# Patient Record
Sex: MALE | Race: WHITE | NOT HISPANIC OR LATINO | URBAN - METROPOLITAN AREA
[De-identification: names, ages, dates, MRNs, and addresses within clinical notes are randomized per-mention and may not be internally consistent; named-entity substitution may affect disease eponyms.]

---

## 2018-02-02 LAB
ALT SERPL-CCNC: 16 U/L (ref 9–46)
AST SERPL-CCNC: 14 U/L (ref 10–40)
CHOLESTEROL (EXTERNAL): 146 MG/DL
CREATININE (EXTERNAL): 1.16 MG/DL (ref 0.6–1.35)
GFR ESTIMATED (EXTERNAL): 75 ML/MIN/1.73M2
GFR ESTIMATED (IF AFRICAN AMERICAN) (EXTERNAL): 86 ML/MIN/1.73M2
GLUCOSE (EXTERNAL): 94 MG/DL (ref 65–99)
HDLC SERPL-MCNC: 37 MG/DL
LDL CHOLESTEROL (EXTERNAL): 89 MG/DL
NON HDL CHOLESTEROL (EXTERNAL): 109 MG/DL
POTASSIUM (EXTERNAL): 4.1 MMOL/L (ref 3.5–5.3)
TRIGLYCERIDES (EXTERNAL): 108 MG/DL

## 2018-06-14 ENCOUNTER — TRANSFERRED RECORDS (OUTPATIENT)
Dept: HEALTH INFORMATION MANAGEMENT | Facility: CLINIC | Age: 48
End: 2018-06-14

## 2018-06-14 LAB
HEP C HIM: NORMAL
HIV 1&2 EXT: NORMAL

## 2020-06-22 ENCOUNTER — TRANSFERRED RECORDS (OUTPATIENT)
Dept: INFECTIOUS DISEASES | Facility: CLINIC | Age: 50
End: 2020-06-22

## 2020-06-23 LAB
ALT SERPL-CCNC: 21 U/L (ref 9–46)
AST SERPL-CCNC: 22 U/L (ref 10–35)
CHOLESTEROL (EXTERNAL): 154 MG/DL
CREATININE (EXTERNAL): 1.08 MG/DL (ref 0.7–1.33)
GFR ESTIMATED (EXTERNAL): 80 ML/MIN/1.73M2
GFR ESTIMATED (IF AFRICAN AMERICAN) (EXTERNAL): 92 ML/MIN/1.73M2
GLUCOSE (EXTERNAL): 87 MG/DL (ref 65–99)
HDLC SERPL-MCNC: 58 MG/DL
LDL CHOLESTEROL (EXTERNAL): 79 MG/DL
NON HDL CHOLESTEROL (EXTERNAL): 96 MG/DL
POTASSIUM (EXTERNAL): 4.1 MMOL/L (ref 3.5–5.3)
TRIGLYCERIDES (EXTERNAL): 88 MG/DL

## 2021-02-18 LAB
ALT SERPL-CCNC: 16 U/L (ref 9–46)
AST SERPL-CCNC: 19 U/L (ref 10–35)
CREATININE (EXTERNAL): 1 MG/DL (ref 0.7–1.33)
GFR ESTIMATED (EXTERNAL): 87 ML/MIN/1.73M2
GFR ESTIMATED (IF AFRICAN AMERICAN) (EXTERNAL): 101 ML/MIN/1.73M2
GLUCOSE (EXTERNAL): 95 MG/DL (ref 65–99)
HBA1C MFR BLD: 5 %
POTASSIUM (EXTERNAL): 4.6 MMOL/L (ref 3.5–5.3)
TSH SERPL-ACNC: 1.46 MIU/L (ref 0.4–4.5)

## 2021-11-08 ENCOUNTER — TRANSFERRED RECORDS (OUTPATIENT)
Dept: INFECTIOUS DISEASES | Facility: CLINIC | Age: 51
End: 2021-11-08

## 2021-11-08 LAB
ALT SERPL-CCNC: 24 U/L (ref 9–46)
AST SERPL-CCNC: 25 U/L (ref 10–35)
CHOLESTEROL (EXTERNAL): 176 MG/DL
CREATININE (EXTERNAL): 1.16 MG/DL (ref 0.7–1.33)
GLUCOSE (EXTERNAL): 84 MG/DL (ref 65–99)
HDLC SERPL-MCNC: 50 MG/DL
HEP C HIM: NORMAL
HIV 1&2 EXT: NORMAL
LDL CHOLESTEROL (EXTERNAL): 96 MG/DL
NON HDL CHOLESTEROL (EXTERNAL): 126 MG/DL
POTASSIUM (EXTERNAL): 4.2 MMOL/L (ref 3.5–5.3)
TRIGLYCERIDES (EXTERNAL): 209 MG/DL

## 2022-06-01 ENCOUNTER — TELEPHONE (OUTPATIENT)
Dept: BEHAVIORAL HEALTH | Facility: CLINIC | Age: 52
End: 2022-06-01

## 2022-06-01 NOTE — TELEPHONE ENCOUNTER
6/1/22: Pt's RN Care Coordinator (volunteer capacity, not a formal role), Michelle, stated that Dominique Plaza told her to call our scheduling line and figure out how to get pt scheduled. He was reportedly seeing an [facility] provider in [place], but needs to get set up with one here. OP intake explained that due to procedure, she's unable to schedule at this time. OP intake offered to schedule with CC, attempted to provide info about the Recovery Clinic. RN CC stated that she will [place] back with provider and/or call Central Scheduling to help pt establish care.

## 2022-06-03 NOTE — CONFIDENTIAL NOTE
OK for patient to schedule with Dominique Plaza.    Please call patient back at number listed in this encounter, or you may be able to reach him at [facility] # is [phone]    Connor Gu MD  Addiction Medicine

## 2022-06-23 ENCOUNTER — VIRTUAL VISIT (OUTPATIENT)
Dept: BEHAVIORAL HEALTH | Facility: CLINIC | Age: 52
End: 2022-06-23
Attending: PSYCHIATRY & NEUROLOGY
Payer: COMMERCIAL

## 2022-06-23 ENCOUNTER — TELEPHONE (OUTPATIENT)
Dept: BEHAVIORAL HEALTH | Facility: CLINIC | Age: 52
End: 2022-06-23

## 2022-06-23 DIAGNOSIS — F15.20 METHAMPHETAMINE USE DISORDER, SEVERE (H): Primary | ICD-10-CM

## 2022-06-23 DIAGNOSIS — Z86.59 HISTORY OF POST TRAUMATIC STRESS DISORDER: ICD-10-CM

## 2022-06-23 DIAGNOSIS — F32.A DEPRESSION, UNSPECIFIED DEPRESSION TYPE: ICD-10-CM

## 2022-06-23 DIAGNOSIS — B20 HUMAN IMMUNODEFICIENCY VIRUS (HIV) DISEASE (H): ICD-10-CM

## 2022-06-23 DIAGNOSIS — Z86.59: ICD-10-CM

## 2022-06-23 PROCEDURE — 99204 OFFICE O/P NEW MOD 45 MIN: CPT | Mod: GT | Performed by: NURSE PRACTITIONER

## 2022-06-23 RX ORDER — ESZOPICLONE 2 MG/1
2 TABLET, FILM COATED ORAL AT BEDTIME
COMMUNITY

## 2022-06-23 RX ORDER — ETRAVIRINE 200 MG/1
TABLET ORAL
COMMUNITY
End: 2022-06-23

## 2022-06-23 RX ORDER — BUPROPION HYDROCHLORIDE 150 MG/1
150 TABLET ORAL AT BEDTIME
COMMUNITY

## 2022-06-23 RX ORDER — MIRTAZAPINE 15 MG/1
15 TABLET, FILM COATED ORAL AT BEDTIME
COMMUNITY

## 2022-06-23 RX ORDER — TOPIRAMATE 25 MG/1
TABLET, FILM COATED ORAL
COMMUNITY
End: 2022-06-23

## 2022-06-23 RX ORDER — BICTEGRAVIR SODIUM, EMTRICITABINE, AND TENOFOVIR ALAFENAMIDE FUMARATE 50; 200; 25 MG/1; MG/1; MG/1
1 TABLET ORAL DAILY
COMMUNITY

## 2022-06-23 RX ORDER — PRAZOSIN HYDROCHLORIDE 2 MG/1
CAPSULE ORAL
COMMUNITY
End: 2022-06-23

## 2022-06-23 RX ORDER — NALTREXONE HYDROCHLORIDE 50 MG/1
50 TABLET, FILM COATED ORAL DAILY
Qty: 90 TABLET | Refills: 1 | Status: SHIPPED | OUTPATIENT
Start: 2022-06-23

## 2022-06-23 RX ORDER — BUPROPION HYDROCHLORIDE 300 MG/1
300 TABLET ORAL EVERY MORNING
COMMUNITY
End: 2022-07-19

## 2022-06-23 RX ORDER — EMTRICITABINE AND TENOFOVIR DISOPROXIL FUMARATE 200; 300 MG/1; MG/1
TABLET, FILM COATED ORAL
COMMUNITY
End: 2022-06-23

## 2022-06-23 RX ORDER — TOPIRAMATE 200 MG/1
TABLET, FILM COATED ORAL
COMMUNITY
End: 2022-06-23

## 2022-06-23 RX ORDER — DESVENLAFAXINE 100 MG/1
TABLET, EXTENDED RELEASE ORAL
COMMUNITY
End: 2022-06-23

## 2022-06-23 RX ORDER — PRAZOSIN HYDROCHLORIDE 5 MG/1
CAPSULE ORAL
COMMUNITY
End: 2022-06-23

## 2022-06-23 RX ORDER — NALTREXONE HYDROCHLORIDE 50 MG/1
50 TABLET, FILM COATED ORAL DAILY
COMMUNITY
End: 2022-06-23

## 2022-06-23 RX ORDER — VARENICLINE TARTRATE 1 MG/1
TABLET, FILM COATED ORAL
COMMUNITY
End: 2022-06-23

## 2022-06-23 RX ORDER — QUETIAPINE FUMARATE 25 MG/1
TABLET, FILM COATED ORAL
COMMUNITY
End: 2022-06-23

## 2022-06-23 RX ORDER — TRAZODONE HYDROCHLORIDE 150 MG/1
100 TABLET ORAL AT BEDTIME
COMMUNITY
End: 2022-08-12

## 2022-06-23 RX ORDER — TOPIRAMATE 100 MG/1
TABLET, FILM COATED ORAL
COMMUNITY
End: 2022-06-23

## 2022-06-23 ASSESSMENT — ANXIETY QUESTIONNAIRES
4. TROUBLE RELAXING: NEARLY EVERY DAY
2. NOT BEING ABLE TO STOP OR CONTROL WORRYING: NOT AT ALL
1. FEELING NERVOUS, ANXIOUS, OR ON EDGE: MORE THAN HALF THE DAYS
3. WORRYING TOO MUCH ABOUT DIFFERENT THINGS: NOT AT ALL
5. BEING SO RESTLESS THAT IT IS HARD TO SIT STILL: NOT AT ALL
GAD7 TOTAL SCORE: 6
6. BECOMING EASILY ANNOYED OR IRRITABLE: SEVERAL DAYS
GAD7 TOTAL SCORE: 6
7. FEELING AFRAID AS IF SOMETHING AWFUL MIGHT HAPPEN: NOT AT ALL

## 2022-06-23 ASSESSMENT — PATIENT HEALTH QUESTIONNAIRE - PHQ9: SUM OF ALL RESPONSES TO PHQ QUESTIONS 1-9: 8

## 2022-06-23 NOTE — PROGRESS NOTES
Medications Phoned  to Pharmacy [] yes [x]no     Medications ordered this visit were e-scribed.  Verified by order class [x] yes  [] no  List medications sent to pharmacy: Naltrexone 50mg    Medication changes or discontinuations were communicated to patient's pharmacy: [] yes  [x] no     Dictation completed at time of chart check: [x] yes  [] no     I have checked the documentation for today s encounters and the above information has been reviewed and completed.        Paula Reynoso CMA June 23, 2022 2:10 PM       This video/telephone visit will be conducted via a call between you and your physician/provider. We have found that certain health care needs can be provided without the need for an in-person physical exam. This service lets us provide the care you need with a video /telephone conversation. If a prescription is necessary we can send it directly to your pharmacy. If lab work is needed we can place an order for that and you can then stop by our lab to have the test done at a later time.    Just as we bill insurance for in-person visits, we also bill insurance for video/telephone visits. If you have questions about your insurance coverage, we recommend that you speak with your insurance company.    Patient has given verbal consent for video/Telephone visit? Yes    Patient would like video visit, please connect : Text link to [phone]  Reason for visit: Intake- Currently in inpatient treatment for addiction-previous methamphetamine use.   Patient verified allergies, medications and pharmacy via telephone.     MALIKA-7 scores:    MALIKA-7 SCORE 6/23/2022   Total Score 6       PHQ-9 scores:   PHQ-9 SCORE 6/23/2022   PHQ-9 Total Score 8       Patient states he  is ready for visit.    Paula Reynoso CMA June 23, 2022 11:46 AM

## 2022-06-23 NOTE — TELEPHONE ENCOUNTER
Coordinator forwarded medication management referral to TC RN pool since next level of care is scheduled. Reply sent to referral source.     Rosaurajudy Gaytan  Transition Clinic Coordinator  Date and Time: 06/23/22 1:40 PM    ----- Message from Vlad Ann sent at 6/23/2022  1:17 PM CDT -----  Regarding: Transition Clinic  Transition Clinic Referral   [place] Only   Limited [place] Availability    Type of Referral:    Referral Type: Therapy: Psychiatry next level of care appointment is not scheduled. Referral for Transition Clinic Psychiatry will be automatically declined if no next level of care scheduled.     TC Psychiatry cannot see patients who do not have active medical insurance    Referring Provider Name: Dominique Plaza CNP    Clinician completing the assessment.     Referring Provider: [facility] PROVIDER    If known, referring provider contact name: Dominique Plaza CNP; Phone Number:   Service Line/Location: [facility] ADDICTION    Reason for Transition Clinic Referral: Psychiatry appointment not available until 12/22/22    Next Level of Care Patient Will Be Transitioned To: [facility] Psychiatry  Provider(s) [facility] Psychiatry cannot see patients who do not have active medical insurance    What Would Be Helpful from the Transition Clinic: Medication management. Interim care.     Needs: NO    Does Patient Have Access to Technology: Yes    Patient E-mail Address: [email or web address]    Current Patient Phone Number: [phone];   Clinician Gender Preference (if applicable): NO    Vlad Ann
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
## 2022-06-24 ENCOUNTER — MYC REFILL (OUTPATIENT)
Dept: BEHAVIORAL HEALTH | Facility: CLINIC | Age: 52
End: 2022-06-24

## 2022-06-24 NOTE — TELEPHONE ENCOUNTER
First attempt to contact pt. Abrahanr left a VM with TC contact info and encouraged a phone call back to schedule initial psychiatry appointment. Writer will postpone for tomorrow.    Tova Alexanderrera  06/24/22  941     Mental Health &Addiction (MH&A)Transition Clinic (TC):     Provides Patient Support While Waiting to Access Programmatic and Outpatient MH&A Care and Provides Select Crisis Assessment Services     NURSING Referral Review  _________________________________________    This RN has reviewed this Medication Management referral to the Transition Clinic and deemed the referral   [x] Appropriate  [] Inappropriate   []Consulting     Based on the following criteria:    Pt has a psychiatric provider (or pending plan) in place for future prescribing: Hilda Gallagher 12/22/22     Timeframe until pt's scheduled psychiatry appointment is less than 6 months: yes    Pt takes psychiatric medications:   traZODone (DESYREL) 150 MG tablet  buPROPion (WELLBUTRIN XL) 150 MG 24 hr tablet  mirtazapine (REMERON) 15 MG tablet  buPROPion (WELLBUTRIN XL) 300 MG 24 hr tablet      Pt's goals seem to align with this temporary service: yes    Any additional pertinent information regarding this referral: Seeing Dominique Plaza for addiction medicine. Please refer to notes from these appointments for further information     Initial contact w/ patient/parent: Left message to call back for more information regarding the transition clinic and to schedule.     The Transition Clinic phone # is [phone].    Rosalind Jaffe RN on June 24, 2022 at 9:29 AM    Additional Scheduling Instructions for Transition Clinic Coordinator:      Patient did not answer the phone. He is an appropriate candidate to be scheduled with [facility].

## 2022-06-24 NOTE — TELEPHONE ENCOUNTER
Transition Clinic Referral   [place] Only   Limited [place] Availability     Type of Referral:     Referral Type: Therapy: Psychiatry next level of care appointment is not scheduled. Referral for Transition Clinic Psychiatry will be automatically declined if no next level of care scheduled.     TC Psychiatry cannot see patients who do not have active medical insurance     Referring Provider Name: Dominique Plaza CNP     Clinician completing the assessment.     Referring Provider: [facility] PROVIDER     If known, referring provider contact name: Dominique Plaza CNP; Phone Number:   Service Line/Location: [facility] ADDICTION     Reason for Transition Clinic Referral: Psychiatry appointment not available until 12/22/22     Next Level of Care Patient Will Be Transitioned To: E Psychiatry   Provider(s) Hilda Gallagher   Location [facility]   TC Psychiatry cannot see patients who do not have active medical insurance     What Would Be Helpful from the Transition Clinic: Medication management. Interim care.      Needs: NO     Does Patient Have Access to Technology: Yes     Patient E-mail Address: [email or web address]     Current Patient Phone Number: [phone];   Clinician Gender Preference (if applicable): NO     Vlad Ann

## 2022-06-28 ENCOUNTER — VIRTUAL VISIT (OUTPATIENT)
Dept: BEHAVIORAL HEALTH | Facility: CLINIC | Age: 52
End: 2022-06-28
Attending: PSYCHIATRY & NEUROLOGY
Payer: COMMERCIAL

## 2022-06-28 DIAGNOSIS — F43.10 PTSD (POST-TRAUMATIC STRESS DISORDER): ICD-10-CM

## 2022-06-28 DIAGNOSIS — F33.1 MODERATE EPISODE OF RECURRENT MAJOR DEPRESSIVE DISORDER (H): Primary | ICD-10-CM

## 2022-06-28 DIAGNOSIS — F15.20 METHAMPHETAMINE USE DISORDER, SEVERE (H): ICD-10-CM

## 2022-06-28 PROCEDURE — 90834 PSYTX W PT 45 MINUTES: CPT | Mod: GT
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
## 2022-07-01 PROBLEM — Z86.59 HISTORY OF POST TRAUMATIC STRESS DISORDER: Status: ACTIVE | Noted: 2022-07-01
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
## 2022-07-06 ENCOUNTER — VIRTUAL VISIT (OUTPATIENT)
Dept: BEHAVIORAL HEALTH | Facility: CLINIC | Age: 52
End: 2022-07-06
Payer: COMMERCIAL

## 2022-07-06 DIAGNOSIS — F43.10 PTSD (POST-TRAUMATIC STRESS DISORDER): Primary | ICD-10-CM

## 2022-07-06 PROCEDURE — 90791 PSYCH DIAGNOSTIC EVALUATION: CPT

## 2022-07-06 ASSESSMENT — ANXIETY QUESTIONNAIRES
GAD7 TOTAL SCORE: 5
8. IF YOU CHECKED OFF ANY PROBLEMS, HOW DIFFICULT HAVE THESE MADE IT FOR YOU TO DO YOUR WORK, TAKE CARE OF THINGS AT HOME, OR GET ALONG WITH OTHER PEOPLE?: SOMEWHAT DIFFICULT
6. BECOMING EASILY ANNOYED OR IRRITABLE: SEVERAL DAYS
GAD7 TOTAL SCORE: 5
7. FEELING AFRAID AS IF SOMETHING AWFUL MIGHT HAPPEN: NOT AT ALL
5. BEING SO RESTLESS THAT IT IS HARD TO SIT STILL: NOT AT ALL
4. TROUBLE RELAXING: SEVERAL DAYS
3. WORRYING TOO MUCH ABOUT DIFFERENT THINGS: SEVERAL DAYS
7. FEELING AFRAID AS IF SOMETHING AWFUL MIGHT HAPPEN: NOT AT ALL
GAD7 TOTAL SCORE: 5
2. NOT BEING ABLE TO STOP OR CONTROL WORRYING: SEVERAL DAYS
1. FEELING NERVOUS, ANXIOUS, OR ON EDGE: SEVERAL DAYS

## 2022-07-08 ASSESSMENT — COLUMBIA-SUICIDE SEVERITY RATING SCALE - C-SSRS
1. HAVE YOU WISHED YOU WERE DEAD OR WISHED YOU COULD GO TO SLEEP AND NOT WAKE UP?: YES
1. IN THE PAST MONTH, HAVE YOU WISHED YOU WERE DEAD OR WISHED YOU COULD GO TO SLEEP AND NOT WAKE UP?: NO
2. HAVE YOU ACTUALLY HAD ANY THOUGHTS OF KILLING YOURSELF?: NO

## 2022-07-18 ENCOUNTER — TELEPHONE (OUTPATIENT)
Dept: INFECTIOUS DISEASES | Facility: CLINIC | Age: 52
End: 2022-07-18

## 2022-07-18 DIAGNOSIS — Z21 HUMAN IMMUNODEFICIENCY VIRUS I INFECTION (H): Primary | ICD-10-CM

## 2022-07-18 NOTE — TELEPHONE ENCOUNTER
Per [facility] Protocol, routine B20 lab orders entered as pt is due for them in order to monitor pt's disease state.  Kaylee Stanley RN

## 2022-07-19 ENCOUNTER — VIRTUAL VISIT (OUTPATIENT)
Dept: ADDICTION MEDICINE | Facility: CLINIC | Age: 52
End: 2022-07-19
Attending: PSYCHIATRY & NEUROLOGY
Payer: COMMERCIAL

## 2022-07-19 DIAGNOSIS — F41.9 ANXIETY: ICD-10-CM

## 2022-07-19 DIAGNOSIS — F15.20 METHAMPHETAMINE USE DISORDER, SEVERE, IN CONTROLLED ENVIRONMENT (H): Primary | ICD-10-CM

## 2022-07-19 PROCEDURE — 99214 OFFICE O/P EST MOD 30 MIN: CPT | Mod: TEL | Performed by: NURSE PRACTITIONER

## 2022-07-19 RX ORDER — QUETIAPINE FUMARATE 25 MG/1
25 TABLET, FILM COATED ORAL 2 TIMES DAILY PRN
Qty: 60 TABLET | Refills: 0 | Status: SHIPPED | OUTPATIENT
Start: 2022-07-19 | End: 2022-08-15

## 2022-07-19 RX ORDER — BUPROPION HYDROCHLORIDE 300 MG/1
300 TABLET ORAL EVERY MORNING
Qty: 90 TABLET | Refills: 1 | Status: SHIPPED | OUTPATIENT
Start: 2022-07-19

## 2022-07-19 NOTE — PROGRESS NOTES
Not roomed by support staff       MH[facility] Post-Appointment Chart -check      Correct pharmacy verified with patient and updated in chart? [] yes [x]no    Medications ordered this visit were e-scribed.  Verified by order class [x] yes  [] no    List Medications:    QUEtiapine (SEROQUEL) 25 MG tablet  buPROPion (WELLBUTRIN XL) 300 MG 24 hr tablet  Class: E-Prescribe    Medication changes or discontinuations were communicated to patient's pharmacy: [] yes  [x] no    UA collected [] yes  [] no  [x] n/a-virtual     Outside referrals / labs, etc support staff to follow up: [] yes  [x] no    Future appointment was made: [] yes  [x] no  [] n/a    Dictation completed at time of chart check: [] yes  [x] no    I have checked the documentation for today s encounters and the above information has been reviewed and completed.      Sandie Chambers on July 19, 2022 at 3:19 PM
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
## 2022-07-24 ENCOUNTER — HEALTH MAINTENANCE LETTER (OUTPATIENT)
Age: 52
End: 2022-07-24

## 2022-07-26 NOTE — TELEPHONE ENCOUNTER
RECORDS RECEIVED FROM: Internal   DATE RECEIVED: 08.17.2022   NOTES (Gather within 2 years) STATUS DETAILS   OFFICE NOTE from referring provider   Internal 06.23.2022 Dominique Plaza CNP   OFFICE NOTE from other specialist     DISCHARGE SUMMARY from hospital     DISCHARGE REPORT from the ER     LABS (any labs) Internal    MEDICATION LIST Internal    IMAGING  (NEED IMAGES AND REPORTS)     Osteomyelitis: Foot imaging      Liver Abscess: Abdominal imaging     Other (anything related to diagnoses

## 2022-07-27 ENCOUNTER — VIRTUAL VISIT (OUTPATIENT)
Dept: BEHAVIORAL HEALTH | Facility: CLINIC | Age: 52
End: 2022-07-27
Payer: COMMERCIAL

## 2022-07-27 DIAGNOSIS — F33.1 MODERATE EPISODE OF RECURRENT MAJOR DEPRESSIVE DISORDER (H): Primary | ICD-10-CM

## 2022-07-27 DIAGNOSIS — F43.10 PTSD (POST-TRAUMATIC STRESS DISORDER): ICD-10-CM

## 2022-07-27 PROCEDURE — 90832 PSYTX W PT 30 MINUTES: CPT | Mod: GT

## 2022-07-27 NOTE — PROGRESS NOTES
[facility]  2022      Behavioral Health Clinician Progress Note    Patient Name: Sidney Tilley           Service Type:  Individual      Service Location:   MyChart / Email (patient reached)     Session Start Time: 1:08pm  Session End Time: 1:40pm      Session Length: 16 - 37      Attendees: Client     Service Modality:  Video Visit:      Provider verified identity through the following two step process.  Patient provided:  Patient     Telemedicine Visit: The patient's condition can be safely assessed and treated via synchronous audio and visual telemedicine encounter.      Reason for Telemedicine Visit: Patient has requested telehealth visit    Originating Site (Patient Location):  [facility]    Distant Site (Provider Location): [facility]    Consent:  The patient/guardian has verbally consented to: the potential risks and benefits of telemedicine (video visit) versus in person care; bill my insurance or make self-payment for services provided; and responsibility for payment of non-covered services.     Patient would like the video invitation sent by:  Text to cell phone: [phone]     Mode of Communication:  Video Conference via [facility]well    As the provider I attest to compliance with applicable laws and regulations related to telemedicine.    Visit Activities (Refresh list every visit): [facility] Only    Diagnostic Assessment Date: 2022  Treatment Plan Review Date: In progress  See Flowsheets for today's PHQ-9 and MALIKA-7 results  Previous PHQ-9:   PHQ-9 SCORE 2022   PHQ-9 Total Score 8     Previous MALIKA-7:   MALIKA-7 SCORE 2022   Total Score - 5 (mild anxiety)   Total Score 6 5       ALINE LEVEL:  No flowsheet data found.    DATA  Extended Session (60+ minutes): No  Interactive Complexity: No  Crisis: No  [facility] Patient: No    Treatment Objective(s)  Addressed in This Session:  Target Behavior(s): disease management/lifestyle changes patient would like to learn coping skills to better manage his symptoms of anxiety/depression and interpersonal functioning skills     Relationship Problems: will address relationship difficulties in a more adaptive manner  Functional Impairment: will effectively address problems that interfere with adaptive functioning  Alcohol / Substance Use: will increase understanding of the effects of substance use  will make healthier choices in regard to substance use  continue to make healthy choices regarding substance use and engage in activities / supportive services that promote sobriety    Current Stressors / Issues:  [facility] met with Sidney via virtual visit. He reports that he has not been experiencing as many urges and cravings to use meth, however when he has them they come on strong and unpredictably. [facility] and Sidney discussed ways in which he domi with these urges and he reports that he has been able to stay sober for over 70 days by reminding himself that the urge will pass and by using distraction techniques such as walking outside, going on the elliptical, and smoking cigarettes. He shared that he knows smoking is not the most healthy strategy and since Monday he is working on quitting using lozenges and nicotine patches. He also reports that Dominique Plaza, his addiction medicine provider, prescribed him Seroquel to take as needed in case the cravings become unmanageable. He shared that he hasn't had to use this yet. [facility] acknowledged his extended period of sobriety and validated how much effort and work he has been putting into this. He then processed with [facility] about a predicament with his aftercare plan and how to make a thoughtful decision about his next steps. Sidney also shared with [facility] that he found out some information about his close friend and some financial debt that he found himself in. [facility] guided Sidney through  processing his emotions of anger and grief around this situation and [facility] encouraged Sidney to try not to push these feelings away but instead allow himself to feel them and work through them. Sidney told [facility] that he has attempted to try journaling about these feelings and allowing himself to process through them this way. Sidney reports that he is still having night terrors regularly but he is able to return to sleep once he is woken up by one. He also reports he is losing weight through working out and diet and he feels encouraged by this. He told [facility] that his long-term therapist in [place] returned from medical leave and he's working on getting credentialed to work with Sidney while he's in [place] and he said feels that the continuity of care would be helpful for him. [facility] and Sidney are scheduled to meet in three weeks to check in.    Progress on Treatment Objective(s) / Homework:  Satisfactory progress - ACTION (Actively working towards change); Intervened by reinforcing change plan / affirming steps taken    Motivational Interviewing    MI Intervention: Expressed Empathy/Understanding, Supported Autonomy, Collaboration, Evocation, Open-ended questions and Reflections: simple and complex     Change Talk Expressed by the Patient: Desire to change Reasons to change Need to change Committment to change    Provider Response to Change Talk: E - Evoked more info from patient about behavior change, A - Affirmed patient's thoughts, decisions, or attempts at behavior change, R - Reflected patient's change talk and S - Summarized patient's change talk statements      Care Plan review completed: No    Medication Review:  No changes to current psychiatric medication(s)    Medication Compliance:  Yes    Changes in Health Issues:   None reported    Chemical Use Review:   Substance Use: Chemical use reviewed, no active concerns identified , patient is currently in a 90 day treatment center     Tobacco Use: Yes, decrease.   Patient reports frequency of use none as of two days ago. Provided support and affirmation for steps taken towards quiting     Assessment: Current Emotional / Mental Status (status of significant symptoms):  Risk status (Self / Other harm or suicidal ideation)  Patient denies a history of suicidal ideation, suicide attempts, self-injurious behavior, homicidal ideation, homicidal behavior and and other safety concerns  Patient denies current fears or concerns for personal safety.  Patient denies current or recent suicidal ideation or behaviors.  Patient denies current or recent homicidal ideation or behaviors.  Patient denies current or recent self injurious behavior or ideation.  Patient denies other safety concerns.  A safety and risk management plan has not been developed at this time, however patient was encouraged to call [place] [phone] should there be a change in any of these risk factors.    Appearance:   Appropriate   Eye Contact:   Good   Psychomotor Behavior: Normal  Restless   Attitude:   Cooperative  Friendly  Orientation:   All  Speech   Rate / Production: Normal/ Responsive   Volume:  Normal   Mood:    Anxious  Normal  Affect:    Appropriate  Blunted   Thought Content:  Clear   Thought Form:  Coherent  Logical   Insight:    Fair     Diagnoses:  1. Moderate episode of recurrent major depressive disorder (H)    2. PTSD (post-traumatic stress disorder)        Collateral Reports Completed:  Not Applicable    Plan: (Homework, other):  Patient was given information about behavioral services and encouraged to schedule a follow up appointment with the clinic [facility] in 3 weeks.  He was also given Cognitive Behavioral Therapy skills to practice when experiencing anxiety and depression and deep Breathing Strategies to practice when experiencing anxiety and depression.  CD Recommendations: Maintain Sobriety. CARMINA De La Fuente, [facility]      Provider Name/ Credentials:  CARMINA Juan  July 27th,  2022    Service performed and documented by LASHAE-Yuly Wood  Note reviewed and clinical supervison by SHMUEL Mejia, [facility] on 8/2/22

## 2022-08-10 ENCOUNTER — LAB (OUTPATIENT)
Dept: LAB | Facility: CLINIC | Age: 52
End: 2022-08-10
Payer: COMMERCIAL

## 2022-08-10 DIAGNOSIS — Z21 HUMAN IMMUNODEFICIENCY VIRUS I INFECTION (H): ICD-10-CM

## 2022-08-10 LAB
ALBUMIN SERPL-MCNC: 4 G/DL (ref 3.4–5)
ALP SERPL-CCNC: 81 U/L (ref 40–150)
ALT SERPL W P-5'-P-CCNC: 33 U/L (ref 0–70)
ANION GAP SERPL CALCULATED.3IONS-SCNC: <1 MMOL/L (ref 3–14)
AST SERPL W P-5'-P-CCNC: 29 U/L (ref 0–45)
BASOPHILS # BLD AUTO: 0 10E3/UL (ref 0–0.2)
BASOPHILS NFR BLD AUTO: 0 %
BILIRUB SERPL-MCNC: 0.6 MG/DL (ref 0.2–1.3)
BUN SERPL-MCNC: 15 MG/DL (ref 7–30)
CALCIUM SERPL-MCNC: 9.2 MG/DL (ref 8.5–10.1)
CD3 CELLS # BLD: 2251 CELLS/UL (ref 603–2990)
CD3 CELLS NFR BLD: 85 % (ref 49–84)
CD3+CD4+ CELLS # BLD: 1286 CELLS/UL (ref 441–2156)
CD3+CD4+ CELLS NFR BLD: 49 % (ref 28–63)
CD3+CD4+ CELLS/CD3+CD8+ CLL BLD: 1.72 % (ref 1.4–2.6)
CD3+CD8+ CELLS # BLD: 746 CELLS/UL (ref 125–1312)
CD3+CD8+ CELLS NFR BLD: 28 % (ref 10–40)
CHLORIDE BLD-SCNC: 109 MMOL/L (ref 94–109)
CO2 SERPL-SCNC: 26 MMOL/L (ref 20–32)
CREAT SERPL-MCNC: 1.07 MG/DL (ref 0.66–1.25)
EOSINOPHIL # BLD AUTO: 0.1 10E3/UL (ref 0–0.7)
EOSINOPHIL NFR BLD AUTO: 2 %
ERYTHROCYTE [DISTWIDTH] IN BLOOD BY AUTOMATED COUNT: 11.7 % (ref 10–15)
GFR SERPL CREATININE-BSD FRML MDRD: 83 ML/MIN/1.73M2
GLUCOSE BLD-MCNC: 92 MG/DL (ref 70–99)
HCT VFR BLD AUTO: 45.1 % (ref 40–53)
HGB BLD-MCNC: 15.8 G/DL (ref 13.3–17.7)
IMM GRANULOCYTES # BLD: 0 10E3/UL
IMM GRANULOCYTES NFR BLD: 0 %
LYMPHOCYTES # BLD AUTO: 2.7 10E3/UL (ref 0.8–5.3)
LYMPHOCYTES NFR BLD AUTO: 38 %
MCH RBC QN AUTO: 32.1 PG (ref 26.5–33)
MCHC RBC AUTO-ENTMCNC: 35 G/DL (ref 31.5–36.5)
MCV RBC AUTO: 92 FL (ref 78–100)
MONOCYTES # BLD AUTO: 0.5 10E3/UL (ref 0–1.3)
MONOCYTES NFR BLD AUTO: 8 %
NEUTROPHILS # BLD AUTO: 3.7 10E3/UL (ref 1.6–8.3)
NEUTROPHILS NFR BLD AUTO: 52 %
NRBC # BLD AUTO: 0 10E3/UL
NRBC BLD AUTO-RTO: 0 /100
PLATELET # BLD AUTO: 174 10E3/UL (ref 150–450)
POTASSIUM BLD-SCNC: 3.9 MMOL/L (ref 3.4–5.3)
PROT SERPL-MCNC: 7.2 G/DL (ref 6.8–8.8)
RBC # BLD AUTO: 4.92 10E6/UL (ref 4.4–5.9)
SODIUM SERPL-SCNC: 134 MMOL/L (ref 133–144)
T CELL COMMENT: ABNORMAL
WBC # BLD AUTO: 7.1 10E3/UL (ref 4–11)

## 2022-08-10 PROCEDURE — 87536 HIV-1 QUANT&REVRSE TRNSCRPJ: CPT | Performed by: STUDENT IN AN ORGANIZED HEALTH CARE EDUCATION/TRAINING PROGRAM

## 2022-08-10 PROCEDURE — 36415 COLL VENOUS BLD VENIPUNCTURE: CPT | Performed by: PATHOLOGY

## 2022-08-10 PROCEDURE — 86360 T CELL ABSOLUTE COUNT/RATIO: CPT | Performed by: STUDENT IN AN ORGANIZED HEALTH CARE EDUCATION/TRAINING PROGRAM

## 2022-08-10 PROCEDURE — 85025 COMPLETE CBC W/AUTO DIFF WBC: CPT | Performed by: PATHOLOGY

## 2022-08-10 PROCEDURE — 80053 COMPREHEN METABOLIC PANEL: CPT | Performed by: PATHOLOGY

## 2022-08-12 ENCOUNTER — VIRTUAL VISIT (OUTPATIENT)
Dept: ADDICTION MEDICINE | Facility: CLINIC | Age: 52
End: 2022-08-12
Payer: COMMERCIAL

## 2022-08-12 DIAGNOSIS — G47.00 INSOMNIA, UNSPECIFIED TYPE: ICD-10-CM

## 2022-08-12 DIAGNOSIS — F15.21: Primary | ICD-10-CM

## 2022-08-12 LAB
HIV1 RNA # PLAS NAA DL=20: <20 COPIES/ML
HIV1 RNA SERPL NAA+PROBE-LOG#: <1.3 {LOG_COPIES}/ML

## 2022-08-12 PROCEDURE — 99214 OFFICE O/P EST MOD 30 MIN: CPT | Mod: TEL | Performed by: NURSE PRACTITIONER

## 2022-08-12 RX ORDER — TRAZODONE HYDROCHLORIDE 150 MG/1
150 TABLET ORAL AT BEDTIME
Qty: 30 TABLET | Refills: 1 | Status: SHIPPED | OUTPATIENT
Start: 2022-08-12

## 2022-08-12 NOTE — PROGRESS NOTES
[facility]    A/P                                                    ASSESSMENT/PLAN  Diagnoses and all orders for this visit:  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  Denies side effects.  Reports some cravings relieved with as needed Seroquel.  He remains in residential treatment program until 9/1 then will move into sober living.  Plan to continue naltrexone and Wellbutrin.  No refills required at this time  Insomnia, unspecified type  Sleeping well with trazodone 150 mg.  States he was taking 800 mg with 2 mg Lunesta for night terrors and sleep disturbance however he is at unable to take Lunesta while at the retreat.  Refill provided.   -     traZODone (DESYREL) 150 MG tablet; Take 1 tablet (150 mg) by mouth At Bedtime    Orders Placed This Encounter   Medications     traZODone (DESYREL) 150 MG tablet     Sig: Take 1 tablet (150 mg) by mouth At Bedtime     Dispense:  30 tablet     Refill:  1       Problem list updated Aug 12, 2022   No problems updated.        PDMP Review       Value Time User    State PDMP site checked  Yes 8/12/2022  3:39 PM Dominique Plaza CNP            RTC  Return in about 26 days (around 9/7/2022) for Follow up, with me, in person schedule at 1130.      Counseled the patient on the importance of having a recovery program in addition to medication to manage recovery.  Components include avoiding isolating, having willingness to change, avoiding triggers and managing cravings. Encouraged having some type of sober network and practicing honesty with trusted support person(s). Encouraged other services such as counseling, 12 step or other self-help organizations.              SUBJECTIVE                                                    Sidney Tilley is a 52 year old male who presents to clinic today for follow up    Visit performed Virtual, via  telephone    Time spent on phone call: 3:20-3:35pm    Recent HPI Details:  Sidney Tilley is a 52 year old male with a history of HIV, Depression, complex PTSD, complex personality disorder, and severe methamphetamine use disorder use dis who presents for follow up for methamphetamine use disorder.      Sidney is still on the [place] residential program.  Last day is 8/11/2022.  He will be going to the [facility] in [place] at that time.  He reports that he began to have intense cravings and urges 2 to 3 weeks ago.  Cravings last 30 minutes.  He is talking to people and trying to use distraction to help relieve create cravings.  He expresses concern about having intense cravings once he leaves given his history of chronic meth use with multiple relapses.  He states in the past when he would have cravings he would get on his phone and call people.  He does not have access to his phone now and is in a secure place but will be leaving soon.  He is wondering if Seroquel would be beneficial to him.  He states that he has taken Seroquel in the past and found it helpful but he often did not have the medication available to him when cravings occurred so he would therefore use.  He has been on gabapentin in the past but states there was abuse potential for him.  He is seeing CARMINA De La Fuente and that is going well.  He has appointment with psychiatry in December however his psychiatrist in [place] is going to try to get a temporary license to [place] to be able to continue to see him.  He also has upcoming appointment with infectious disease in mid August.     Methamphetamine use disorder, severe, in controlled environment (H)  Sidney remains at the [place] residential program.  Discharge date is 8/11/2022.  He will be going to [facility] in [place].  He is taking bupropion  mg daily and naltrexone 50 mg.  He is experiencing intense cravings and urges in the last 2 to 3 weeks.   "He becomes restless, anxious, and has difficulty concentrating.  Plan to trial Seroquel 25 mg twice daily as needed.  This medication has been effective for him in the past but he took it consistently.  Continue bupropion  mg daily and naltrexone 50 mg daily.  -     QUEtiapine (SEROQUEL) 25 MG tablet; Take 1 tablet (25 mg) by mouth 2 times daily as needed (anxiety, restlessness)  -     buPROPion (WELLBUTRIN XL) 300 MG 24 hr tablet; Take 1 tablet (300 mg) by mouth every morning  Anxiety  During times of intense cravings he becomes more anxious.  Has tried gabapentin but there was abuse potential.  Will trial Seroquel 25 mg twice daily as needed.  He has appointment with psychiatry in December however his psychiatrist in [place] is trying to get temporary license in [place] to allow him to see Sidney.  He will keep his appointment in December in the event that he is unable to return to his prior psychiatrist.  He is seeing ORLANDO De La Fuente and that is going well.  Has an appointment with therapist in October.  Plan to continue to see Yuly until he can establish with new therapist.  -     QUEtiapine (SEROQUEL) 25 MG tablet; Take 1 tablet (25 mg) by mouth 2 times daily as needed (anxiety, restlessness)     TODAY'S VISIT  HPI Aug 12, 2022  Sidney Tilley is a 52 year old male with a history of HIV, Depression, complex PTSD, complex personality disorder, and severe methamphetamine use disorder use dis who presents for follow up for methamphetamine use disorder.     Sidney is still in the [place] residential program.  States he was supposed to go to sober house yesterday but there has been a lot of relapsing and that community.  It was felt that another sober home would be more appropriate for him and bed is not available until 9/1/2022.    -He continues to be very motivated for his recovery, stating \"I have no other option\".   - He continues to experience some cravings.  Utilize Seroquel as " needed x1 with relief noted to severe cravings.   - He is taking Wellbutrin and naltrexone daily.    -Best friend has been sober for 20 years and works at the [employer].  -Looking forward to moving into strong recovery commuting [facility] in [facility].  -No plans to return to [place] anytime soon  -He has not heard back from his psychiatrist in [place] who is going to try to obtain a temporary [place] license to provide care to him while he is here.  He plans to call him after today's appointment.    OBJECTIVE                                                    PHYSICAL EXAM:  There were no vitals taken for this visit.    GENERAL: healthy, alert and no distress  RESP: no audible wheeze, cough.  No increased work of breathing.  Able to speak fully in complete sentences.  PSYCH: mentation appears normal, affect normal/bright, judgement and insight intact, normal speech      LAB  No results found for any visits on 08/12/22.      HISTORY                                                    Problem list reviewed & adjusted, as indicated.  Patient Active Problem List   Diagnosis     Hx of personality disorder         MEDICATION LIST (prior to visit)  bictegravir-emtricitabine-tenofovir (BIKTARVY) -25 MG per tablet, Take 1 tablet by mouth daily  buPROPion (WELLBUTRIN XL) 150 MG 24 hr tablet, Take 150 mg by mouth At Bedtime  buPROPion (WELLBUTRIN XL) 300 MG 24 hr tablet, Take 1 tablet (300 mg) by mouth every morning  eszopiclone (LUNESTA) 2 MG tablet, Take 2 mg by mouth At Bedtime  mirtazapine (REMERON) 15 MG tablet, Take 15 mg by mouth At Bedtime  naltrexone (DEPADE/REVIA) 50 MG tablet, Take 1 tablet (50 mg) by mouth daily  QUEtiapine (SEROQUEL) 25 MG tablet, Take 1 tablet (25 mg) by mouth 2 times daily as needed (anxiety, restlessness)    No current facility-administered medications on file prior to visit.      MEDICATION LIST (after visit)  Current Outpatient Medications   Medication      bictegravir-emtricitabine-tenofovir (BIKTARVY) -25 MG per tablet     buPROPion (WELLBUTRIN XL) 150 MG 24 hr tablet     buPROPion (WELLBUTRIN XL) 300 MG 24 hr tablet     eszopiclone (LUNESTA) 2 MG tablet     mirtazapine (REMERON) 15 MG tablet     naltrexone (DEPADE/REVIA) 50 MG tablet     QUEtiapine (SEROQUEL) 25 MG tablet     traZODone (DESYREL) 150 MG tablet     No current facility-administered medications for this visit.         No Known Allergies    Additional MDM Details:  none      Dominique Plaza, MEG,MSN, AGNP-C  [facility]   [address] [place] [address], [address] [place] [address]   Phone # [phone]

## 2022-08-12 NOTE — NURSING NOTE
This video/telephone visit will be conducted via a call between you and your physician/provider. We have found that certain health care needs can be provided without the need for an in-person physical exam. This service lets us provide the care you need with a video /telephone conversation. If a prescription is necessary we can send it directly to your pharmacy. If lab work is needed we can place an order for that and you can then stop by our lab to have the test done at a later time.    Just as we bill insurance for in-person visits, we also bill insurance for video/telephone visits. If you have questions about your insurance coverage, we recommend that you speak with your insurance company.    Patient has given verbal consent for video/Telephone visit? yes    Patient would like a telephone visit, please connect/call : [phone]    Reason for visit: follow up  Anything the provider should be aware of for today's appointment: none    Patient verified allergies, medications and pharmacy via phone.     MALIKA-7 scores:    MALIKA-7 SCORE 6/23/2022 7/6/2022   Total Score - 5 (mild anxiety)   Total Score 6 5       PHQ-9 scores:   PHQ-9 SCORE 6/23/2022   PHQ-9 Total Score 8       Patient states they are ready for visit.  Need refills on trazodone, pend medication for provider  [place]  to be reviewed by provider  Vivian Charles MA August 12, 2022 3:15 PM    MH&A Post-Appointment Chart -check      Medications ordered this visit were e-scribed.  Verified by order class [x] yes  [] no    List Medications:  Trazodone 50mg    Medication changes or discontinuations were communicated to patient's pharmacy: [] yes  [x] no    UA collected [] yes  [] no  [x] n/a-virtual     Outside referrals / labs, etc support staff to follow up: [] yes  [x] no    Future appointment was made: [x] yes  [] no  [] n/a  Future Appointments 8/12/2022 - 2/8/2023              Date Visit Type Length Department Provider     8/16/2022  2:00 PM [facility] RETURN 30 min  [facility] BEHAVIORAL Yuly Wood LGSW              8/17/2022  1:00 PM [facility] NEW 60 min  INFECTIOUS DISEASE Estephania Sykes MD    Location Instructions:     Located in the [facility] at [address] [place] [id number]. For parking options, enter the [facility] /arrival plaza from [facility] and attendants can assist you based on your needs.  parking is available for those with limited mobility M-F from 7 a.m. to 5 p.m. Due to short staffing, we are unable to offer  to all patients/visitors. Visit [email or web address] for more details.  Self-parking:&nbsp;  West Lot: Located across from the main entrance, this is a convenient option for patients. Enter on [facility]. Parking attendants available most hours to assist.&nbsp;     [facility] Ramp: Enter at the [facility] SE entrance (one block north of the [facility] main entrance). Do not enter the ramp from [facility] - this entrance is not staffed and is further from the [facility] main entrance.              9/7/2022 11:30 AM ADDICTION MED RETURN 30 min [facility] ADDICTION MEDICINE Dominique Plaza, LIANE              10/13/2022  9:00 AM ADULT PSYCHOTHERAPY NEW 60 min [facility] Jesus Murillo LMFT              12/22/2022  1:00 PM ADULT PSYCHIATRY NEW 60 min [facility] PSYCHIATRY Hilda Gallagher NP                   Dictation completed at time of chart check: [x] yes  [] no    I have checked the documentation for today s encounters and the above information has been reviewed and completed.      Vivian Charles MA on August 12, 2022 at 4:07 PM

## 2022-08-15 DIAGNOSIS — F15.20 METHAMPHETAMINE USE DISORDER, SEVERE, IN CONTROLLED ENVIRONMENT (H): ICD-10-CM

## 2022-08-15 DIAGNOSIS — F41.9 ANXIETY: ICD-10-CM

## 2022-08-15 RX ORDER — QUETIAPINE FUMARATE 25 MG/1
TABLET, FILM COATED ORAL
Qty: 60 TABLET | Refills: 0 | Status: SHIPPED | OUTPATIENT
Start: 2022-08-15 | End: 2022-09-13

## 2022-08-15 NOTE — TELEPHONE ENCOUNTER
Date of Last Office Visit: 8/12/22  Date of Next Office Visit: 9/7/22  No shows since last visit: 0  Cancellations since last visit: 0    Medication requested: Quetiapine 25 mg Date last ordered: 7/19/22 Qty: 60 Refills: 0     Review of [place] ?: NA    Lapse in medication adherence greater than 5 days?: no  If yes, call patient and gather details: NA  Medication refill request verified as identical to current order?: yes  Result of Last DAM, VPA, Li+ Level, CBC, or Carbamazepine Level (at or since last visit): N/A    Last visit treatment plan:   ASSESSMENT/PLAN  Diagnoses and all orders for this visit:  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  Denies side effects.  Reports some cravings relieved with as needed Seroquel.  He remains in residential treatment program until 9/1 then will move into sober living.  Plan to continue naltrexone and Wellbutrin.  No refills required at this time  Insomnia, unspecified type  Sleeping well with trazodone 150 mg.  States he was taking 800 mg with 2 mg Lunesta for night terrors and sleep disturbance however he is at unable to take Lunesta while at the retreat.  Refill provided.   -     traZODone (DESYREL) 150 MG tablet; Take 1 tablet (150 mg) by mouth At Bedtime        Orders Placed This Encounter   Medications     traZODone (DESYREL) 150 MG tablet       Sig: Take 1 tablet (150 mg) by mouth At Bedtime       Dispense:  30 tablet       Refill:  1       Problem list updated Aug 12, 2022   No problems updated.         PDMP Review        Value Time User     State PDMP site checked  Yes 8/12/2022  3:39 PM Dominique Plaza CNP        RTC  Return in about 26 days (around 9/7/2022) for Follow up, with me, in person schedule at 1130.        [x]Medication refilled per  Medication Refill in Ambulatory Care  policy.  []Medication unable to be refilled by RN due to criteria not met as indicated below:     []Eligibility - not seen in the last year   []Supervision - no future appointment   []Compliance - no shows, cancellations or lapse in therapy   []Verification - order discrepancy   []Controlled medication   []Medication not included in policy   []90-day supply request   []Other

## 2022-08-16 ENCOUNTER — VIRTUAL VISIT (OUTPATIENT)
Dept: BEHAVIORAL HEALTH | Facility: CLINIC | Age: 52
End: 2022-08-16
Payer: COMMERCIAL

## 2022-08-16 DIAGNOSIS — F43.10 PTSD (POST-TRAUMATIC STRESS DISORDER): Primary | ICD-10-CM

## 2022-08-16 PROCEDURE — 90832 PSYTX W PT 30 MINUTES: CPT | Mod: GT

## 2022-08-16 ASSESSMENT — ENCOUNTER SYMPTOMS
STIFFNESS: 0
ARTHRALGIAS: 1
NERVOUS/ANXIOUS: 1
NECK PAIN: 0
DEPRESSION: 1
BACK PAIN: 0
MUSCLE CRAMPS: 0
MYALGIAS: 0
DECREASED CONCENTRATION: 0
INSOMNIA: 1
PANIC: 0
JOINT SWELLING: 0
MUSCLE WEAKNESS: 0

## 2022-08-16 NOTE — PROGRESS NOTES
[facility]  2022      Behavioral Health Clinician Progress Note    Patient Name: Sidney Tilley           Service Type:  Individual      Service Location:   MyChart / Email (patient reached)     Session Start Time: 02:15pm  Session End Time: 2:45pm      Session Length: 16 - 37      Attendees: Client     Service Modality:  Video Visit:      Provider verified identity through the following two step process.  Patient provided:  Patient     Telemedicine Visit: The patient's condition can be safely assessed and treated via synchronous audio and visual telemedicine encounter.      Reason for Telemedicine Visit: Patient has requested telehealth visit    Originating Site (Patient Location):  [facility]    Distant Site (Provider Location): [facility]    Consent:  The patient/guardian has verbally consented to: the potential risks and benefits of telemedicine (video visit) versus in person care; bill my insurance or make self-payment for services provided; and responsibility for payment of non-covered services.     Patient would like the video invitation sent by:  Text to cell phone: [phone]     Mode of Communication:  Video Conference via [facility]    As the provider I attest to compliance with applicable laws and regulations related to telemedicine.    Visit Activities (Refresh list every visit): [facility] Only    Diagnostic Assessment Date: 2022  Treatment Plan Review Date: In progress  See Flowsheets for today's PHQ-9 and MALIKA-7 results  Previous PHQ-9:   PHQ-9 SCORE 2022   PHQ-9 Total Score 8     Previous MALIKA-7:   MALIKA-7 SCORE 2022   Total Score - 5 (mild anxiety)   Total Score 6 5       ALINE LEVEL:  No flowsheet data found.    DATA  Extended Session (60+ minutes): No  Interactive Complexity: No  Crisis: No  [facility] Patient: No    Treatment  Objective(s) Addressed in This Session:  Target Behavior(s): disease management/lifestyle changes patient would like to learn coping skills to better manage his symptoms of anxiety/depression and interpersonal functioning skills     Relationship Problems: will address relationship difficulties in a more adaptive manner  Functional Impairment: will effectively address problems that interfere with adaptive functioning  Alcohol / Substance Use: will increase understanding of the effects of substance use  will make healthier choices in regard to substance use  continue to make healthy choices regarding substance use and engage in activities / supportive services that promote sobriety    Current Stressors / Issues:  [facility] met with Sidney via virtual visit. He is currently at the [facility] and reports that he plans to graduate from the program and move into sober housing September 1st. He shared with [facility] that he is still experiencing cravings to use meth, however he has been able to avoid giving in. He shared that there are certain triggers that he encounters on a daily basis that make the cravings worse at times. He said that they can be difficult to talk about because there is a lot of shame and guilt around them and it can be exhausting talking about them and sharing them. They processed where he feels this in his body and he shared that he experiences symptoms of anxiety in his core and chest. [facility] acknowledged Sidney for being able to share some of these triggers with [facility] and they talked about practicing a mindful approach to managing these judgmental thoughts as them arise, by simply naming the thought, accepting it, and moving forward. Sidney shared with [facility] that he spoke with his family recently and his mom is struggling with her Alzheimer's and he feels a lot of guilt and shame that he's not able to be there for her and his siblings to help. He expressed the complexity of his feelings around this  and how his relationship with is mom complicates things, [place]. [facility] and Sidney discussed whether his therapist from [place] that he was seeing on a long-term basis would be able to continue to see him ongoing in [place] and he said he is waiting for confirmation with him about this. Sidney said he received a call from [facility] stating that there is an opening sooner to get in to see a [facility] long-term therapist and he plans to call back and see if this would be an option and/or good fit for him. DUSTY and Sidney scheduled a follow up appointment on September 7th, when he will be in the clinic to see his addiction medicine provider, Dominique Plaza.     Progress on Treatment Objective(s) / Homework:  Satisfactory progress - ACTION (Actively working towards change); Intervened by reinforcing change plan / affirming steps taken    Motivational Interviewing    MI Intervention: Expressed Empathy/Understanding, Supported Autonomy, Collaboration, Evocation, Open-ended questions and Reflections: simple and complex     Change Talk Expressed by the Patient: Desire to change Reasons to change Need to change Committment to change    Provider Response to Change Talk: E - Evoked more info from patient about behavior change, A - Affirmed patient's thoughts, decisions, or attempts at behavior change, R - Reflected patient's change talk and S - Summarized patient's change talk statements      Care Plan review completed: No    Medication Review:  No changes to current psychiatric medication(s)    Medication Compliance:  Yes    Changes in Health Issues:   None reported    Chemical Use Review:   Substance Use: Chemical use reviewed, no active concerns identified , patient is currently in a 90 day treatment center     Tobacco Use: Yes, decrease.  Patient reports frequency of use none as of two days ago. Provided support and affirmation for steps taken towards quiting     Assessment: Current  Emotional / Mental Status (status of significant symptoms):  Risk status (Self / Other harm or suicidal ideation)  Patient denies a history of suicidal ideation, suicide attempts, self-injurious behavior, homicidal ideation, homicidal behavior and and other safety concerns  Patient denies current fears or concerns for personal safety.  Patient denies current or recent suicidal ideation or behaviors.  Patient denies current or recent homicidal ideation or behaviors.  Patient denies current or recent self injurious behavior or ideation.  Patient denies other safety concerns.  A safety and risk management plan has not been developed at this time, however patient was encouraged to call [place] [phone] should there be a change in any of these risk factors.    Appearance:   Appropriate   Eye Contact:   Good   Psychomotor Behavior: Normal   Attitude:   Cooperative  Friendly  Orientation:   All  Speech   Rate / Production: Normal/ Responsive   Volume:  Normal   Mood:    Anxious  Fearful  Affect:    Appropriate  Blunted   Thought Content:  Clear   Thought Form:  Coherent  Logical   Insight:    Fair     Diagnoses:   1. PTSD (post-traumatic stress disorder)        Collateral Reports Completed:  Not Applicable    Plan: (Homework, other):  Patient was given information about behavioral services and encouraged to schedule a follow up appointment with the clinic [facility] in 3 weeks.  He was also given Cognitive Behavioral Therapy skills to practice when experiencing anxiety and depression and deep Breathing Strategies to practice when experiencing anxiety and depression.  CD Recommendations: Maintain Sobriety. CARMINA De La Fuente, [facility]      Provider Name/ Credentials:  CARMINA Juan  August 16th, 2022    Service performed and documented by Al Wood  Note reviewed and clinical supervison by SHMUEL Mejia, CARMINA on 8/18/22

## 2022-08-17 ENCOUNTER — OFFICE VISIT (OUTPATIENT)
Dept: INFECTIOUS DISEASES | Facility: CLINIC | Age: 52
End: 2022-08-17
Attending: STUDENT IN AN ORGANIZED HEALTH CARE EDUCATION/TRAINING PROGRAM
Payer: COMMERCIAL

## 2022-08-17 ENCOUNTER — PRE VISIT (OUTPATIENT)
Dept: INFECTIOUS DISEASES | Facility: CLINIC | Age: 52
End: 2022-08-17

## 2022-08-17 VITALS
DIASTOLIC BLOOD PRESSURE: 70 MMHG | WEIGHT: 215.8 LBS | SYSTOLIC BLOOD PRESSURE: 101 MMHG | OXYGEN SATURATION: 96 % | HEART RATE: 82 BPM

## 2022-08-17 DIAGNOSIS — Z21 ASYMPTOMATIC HIV INFECTION (H): ICD-10-CM

## 2022-08-17 DIAGNOSIS — B20 HUMAN IMMUNODEFICIENCY VIRUS (HIV) DISEASE (H): ICD-10-CM

## 2022-08-17 DIAGNOSIS — Z12.11 ENCOUNTER FOR SCREENING COLONOSCOPY: Primary | ICD-10-CM

## 2022-08-17 PROCEDURE — 99205 OFFICE O/P NEW HI 60 MIN: CPT | Performed by: STUDENT IN AN ORGANIZED HEALTH CARE EDUCATION/TRAINING PROGRAM

## 2022-08-17 PROCEDURE — G0463 HOSPITAL OUTPT CLINIC VISIT: HCPCS

## 2022-08-17 ASSESSMENT — PAIN SCALES - GENERAL: PAINLEVEL: NO PAIN (0)

## 2022-08-17 NOTE — LETTER
8/17/2022       RE: Sidney Tilely  [address] [place] [address]     Dear Colleague,    Thank you for referring your patient, Sidney Tilley, to the [facility] INFECTIOUS DISEASE CLINIC [place] at [facility]. Please see a copy of my visit note below.    [facility] - HIV Care  Patient:  Sidney Tilley, Date of birth 1970, Medical record number [id number]  Date of Visit:  08/17/2022         Assessment and Recommendations:       51 y/o M here to establish care for HIV. He is here for [place] related care from [place], and is on Biktarvy, with compliance for past 97 days while he was in rehab, previous non compliance while under the influence of subtances  CD4 1286, VL <20 in 8/2022    Recs  - We will try to obtain records re immunization, lipid testing etc  - Follow up with addiction medicine as scheduled  - Colonoscopy appointment  - Follow up with me in 4 months   - Labs 1 weeks prior  - Continue Biktarvy, call when you need refills      Preventative Medicine  Cardiovascular Matheus:                        Lipids: to obtain records                       Blood Pressure: 161/70  Cancer Screening:                       Colon: will order today                       Anal: to discuss   Immunizations:                       Hepatitis A: to obtain records                       Hepatitis B: to obtain records                       Influenza: Recommended Seasonal Vaccine                       Pneumovax/Prevnar: to obtain records                       Tdap: to obtain records                       Meningococcus:to obtain records   Bone Health: No screening indicated at this time  Renal Health: Cr: normal  Sexually Transmitted Infection Risk and Screening:                       Gonorrhea and Chlamydia: not indicated at this time                       Syphilis: not indicated at this  time    Total time including chart review, visit, counseling, documentation, coordination of care on day of visit: 60 minutes              Estephania Sykes  ID Staff          History of Infectious Disease Illness:     51 y/o M here to establish care for HIV. He is here for addiction related care from [place], and is on Biktarvy, with compliance for past 97 days while he was sober in rehab, previous non compliance while under the influence of substances (4 days of non compliance every 3 months or so on average).    CD4 1286, VL <20 in 8/2022 .Is at the facility called [facility] here for rehab, that he finds very helpful.  He will be moving to a jail house here soon, and planning to be in [place] indefinitely. He would liek to stay away from [place] and his drug source. DOC is IV crystal meth. Also current smoker, working on quitting. Identifies as an alcoholic too.     Diagnosed in 2006, was not on treatment till 2011, was on Intelence plus another unknown drug initially, later switched to Biktarvy, tolerating.     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 H/o chlamydia, gonorrhea and syphilis    He has 2 months of Biktarvy refills          HIV History:   Date of Diagnosis: 2006    (Awaiting records for all below)  Approximated time of transmission:  CD4 Ramiro:  Viral Load at Diagnosis:  Risk Factors:  Opportunistic Infections:  CMV Status:  Toxo Status:  HLA  Status:  Tuberculosis Screening:  Historical use of ARVs:  Historical Resistance Mutations:        Past Medical and Surgical History:   PMH:  Rigth tennis elbow  Left arm lipoma  H/o chlamydia, gonorhea, syphils    PSH:  Appendectomy        Family History:   No family history on file.        Social History:     Social History     Tobacco Use     Smoking status: Current Every Day Smoker     Packs/day: 1.00     Types: Cigarettes     Smokeless tobacco:  Never Used   Vaping Use     Vaping Use: Never used   Substance Use Topics     Alcohol use: Not Currently     Comment: Last drink May 12,  2022     Drug use: Not Currently     Types: Methamphetamines, Cocaine, Marijuana, MDMA (Ecstasy)     Comment: IV user with Methamphetamine     Social History     Social History Narrative     Not on file            Review of Systems:   CONSTITUTIONAL:  No fevers or chills  EYES: negative for icterus  ENT:  negative for hearing loss, tinnitus, sore throat  RESPIRATORY:  negative for cough, sputum or dyspnea  CARDIOVASCULAR:  negative for chest pain, palpitations  GASTROINTESTINAL:  negative for nausea, vomiting, diarrhea or constipation  GENITOURINARY:  negative for dysuria  HEME:  No easy bruising  INTEGUMENT:  negative for rash or pruritus  NEURO:  Negative for headache         Current Medications:     Current Outpatient Medications   Medication Sig Dispense Refill     bictegravir-emtricitabine-tenofovir (BIKTARVY) -25 MG per tablet Take 1 tablet by mouth daily       buPROPion (WELLBUTRIN XL) 150 MG 24 hr tablet Take 150 mg by mouth At Bedtime       buPROPion (WELLBUTRIN XL) 300 MG 24 hr tablet Take 1 tablet (300 mg) by mouth every morning 90 tablet 1     eszopiclone (LUNESTA) 2 MG tablet Take 2 mg by mouth At Bedtime       mirtazapine (REMERON) 15 MG tablet Take 15 mg by mouth At Bedtime       naltrexone (DEPADE/REVIA) 50 MG tablet Take 1 tablet (50 mg) by mouth daily 90 tablet 1     QUEtiapine (SEROQUEL) 25 MG tablet TAKE 1 TABLET(25 MG) BY MOUTH TWICE DAILY AS NEEDED FOR ANXIETY OR RESTLESSNESS 60 tablet 0     traZODone (DESYREL) 150 MG tablet Take 1 tablet (150 mg) by mouth At Bedtime 30 tablet 1            Immunization History:     There is no immunization history on file for this patient.- will obtain records         Allergies:   No Known Allergies         Physical Exam:   Vital signs:  /70   Pulse 82   Wt 97.9 kg (215 lb 12.8 oz)   SpO2 96%     Physical  Examination:  GENERAL:  well-developed, well-nourished, seated in no acute distress.  HEENT:  Head is normocephalic, atraumatic   EYES:  Eyes have anicteric sclerae without conjunctival injection   ENT:  Oropharynx is moist without exudates or ulcers. Tongue is midline  LUNGS:  Clear to auscultation bilateral.   CARDIOVASCULAR:  Regular rate and rhythm with no murmurs, gallops or rubs.  SKIN:  No acute rashes.    NEUROLOGIC:  Grossly nonfocal. Active x4 extremities         Laboratory Data:     CD4 Count  Absolute CD4, [place] T Cells   Date Value Ref Range Status   08/10/2022 1,286 441 - 2,156 cells/uL Final     CD4% [place] T Cells   Date Value Ref Range Status   08/10/2022 49 28 - 63 % Final     CD4:CD8 Ratio   Date Value Ref Range Status   08/10/2022 1.72 1.40 - 2.60 Final       HIV-1 RNA Quantitative:  HIV-1 RNA copies/mL   Date Value Ref Range Status   08/10/2022 <20 (A) Not Detected Copies/mL Final       Metabolic Studies       Sodium   Date Value Ref Range Status   08/10/2022 134 133 - 144 mmol/L Final     Potassium   Date Value Ref Range Status   08/10/2022 3.9 3.4 - 5.3 mmol/L Final     Chloride   Date Value Ref Range Status   08/10/2022 109 94 - 109 mmol/L Final     Carbon Dioxide (CO2)   Date Value Ref Range Status   08/10/2022 26 20 - 32 mmol/L Final     Anion Gap   Date Value Ref Range Status   08/10/2022 <1 (L) 3 - 14 mmol/L Final     Urea Nitrogen   Date Value Ref Range Status   08/10/2022 15 7 - 30 mg/dL Final     Creatinine   Date Value Ref Range Status   08/10/2022 1.07 0.66 - 1.25 mg/dL Final     GFR Estimate   Date Value Ref Range Status   08/10/2022 83 >60 mL/min/1.73m2 Final     Comment:     Effective December 21, 2021 eGFRcr in adults is calculated using the 2021 CKD-EPI creatinine equation which includes age and gender (David et al., NEJ, DOI: 10.1056/ZZKSeh6844457)     Glucose   Date Value Ref Range Status   08/10/2022 92 70 - 99 mg/dL Final     Calcium   Date Value Ref Range Status    08/10/2022 9.2 8.5 - 10.1 mg/dL Final       Hepatic Studies    Bilirubin Total   Date Value Ref Range Status   08/10/2022 0.6 0.2 - 1.3 mg/dL Final     Alkaline Phosphatase   Date Value Ref Range Status   08/10/2022 81 40 - 150 U/L Final     Albumin   Date Value Ref Range Status   08/10/2022 4.0 3.4 - 5.0 g/dL Final     AST   Date Value Ref Range Status   08/10/2022 29 0 - 45 U/L Final     ALT   Date Value Ref Range Status   08/10/2022 33 0 - 70 U/L Final       Hematology Studies      WBC Count   Date Value Ref Range Status   08/10/2022 7.1 4.0 - 11.0 10e3/uL Final     Hemoglobin   Date Value Ref Range Status   08/10/2022 15.8 13.3 - 17.7 g/dL Final     Hematocrit   Date Value Ref Range Status   08/10/2022 45.1 40.0 - 53.0 % Final     Platelet Count   Date Value Ref Range Status   08/10/2022 174 150 - 450 10e3/uL Final                 Again, thank you for allowing me to participate in the care of your patient.      Sincerely,    Estephania Sykes MD

## 2022-08-17 NOTE — PATIENT INSTRUCTIONS
- We will try to obtain records re immunization, lipid testing etc  - Follow up with addiction medicine as scheduled  - Colonoscopy appointment  - Follow up with me in 4 months   - Labs 1 weeks prior

## 2022-08-17 NOTE — NURSING NOTE
Chief Complaint   Patient presents with     Consult     New pt     Blood pressure 101/70, pulse 82, weight 97.9 kg (215 lb 12.8 oz), SpO2 96 %.    Veronica Pierre

## 2022-08-17 NOTE — PROGRESS NOTES
[facility] - HIV Care  Patient:  Sidney Tilley, Date of birth 1970, Medical record number [id number]  Date of Visit:  08/17/2022         Assessment and Recommendations:       51 y/o M here to establish care for HIV. He is here for addiction related care from [place], and is on Biktarvy, with compliance for past 97 days while he was in rehab, previous non compliance while under the influence of subtances  CD4 1286, VL <20 in 8/2022    Recs  - We will try to obtain records re immunization, lipid testing etc  - Follow up with addiction medicine as scheduled  - Colonoscopy appointment  - Follow up with me in 4 months   - Labs 1 weeks prior  - Continue Biktarvy, call when you need refills      Preventative Medicine  Cardiovascular Matheus:                        Lipids: to obtain records                       Blood Pressure: 161/70  Cancer Screening:                       Colon: will order today                       Anal: to discuss   Immunizations:                       Hepatitis A: to obtain records                       Hepatitis B: to obtain records                       Influenza: Recommended Seasonal Vaccine                       Pneumovax/Prevnar: to obtain records                       Tdap: to obtain records                       Meningococcus:to obtain records   Bone Health: No screening indicated at this time  Renal Health: Cr: normal  Sexually Transmitted Infection Risk and Screening:                       Gonorrhea and Chlamydia: not indicated at this time                       Syphilis: not indicated at this time    Total time including chart review, visit, counseling, documentation, coordination of care on day of visit: 60 minutes              Estephania ROJAS Staff          History of Infectious Disease Illness:     51 y/o M here to establish care for HIV. He is here for addiction related care from [place], and is on Biktarvy, with compliance for past  97 days while he was sober in rehab, previous non compliance while under the influence of substances (4 days of non compliance every 3 months or so on average).    CD4 1286, VL <20 in 8/2022 .Is at the facility called [facility] here for rehab, that he finds very helpful.  He will be moving to a senior living house here soon, and planning to be in [place] indefinitely. He would liek to stay away from [place] and his drug source. DOC is IV crystal meth. Also current smoker, working on quitting. Identifies as an alcoholic too.     Diagnosed in 2006, was not on treatment till 2011, was on Intelence plus another unknown drug initially, later switched to Biktarvy, tolerating.     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 H/o chlamydia, gonorrhea and syphilis    He has 2 months of Biktarvy refills          HIV History:   Date of Diagnosis: 2006    (Awaiting records for all below)  Approximated time of transmission:  CD4 Ramiro:  Viral Load at Diagnosis:  Risk Factors:  Opportunistic Infections:  CMV Status:  Toxo Status:  HLA  Status:  Tuberculosis Screening:  Historical use of ARVs:  Historical Resistance Mutations:        Past Medical and Surgical History:   PMH:  Rigth tennis elbow  Left arm lipoma  H/o chlamydia, gonorhea, syphils    PSH:  Appendectomy        Family History:   No family history on file.        Social History:     Social History     Tobacco Use     Smoking status: Current Every Day Smoker     Packs/day: 1.00     Types: Cigarettes     Smokeless tobacco: Never Used   Vaping Use     Vaping Use: Never used   Substance Use Topics     Alcohol use: Not Currently     Comment: Last drink May 12,  2022     Drug use: Not Currently     Types: Methamphetamines, Cocaine, Marijuana, MDMA (Ecstasy)     Comment: IV user with Methamphetamine     Social History     Social History Narrative     Not on file             Review of Systems:   CONSTITUTIONAL:  No fevers or chills  EYES: negative for icterus  ENT:  negative for hearing loss, tinnitus, sore throat  RESPIRATORY:  negative for cough, sputum or dyspnea  CARDIOVASCULAR:  negative for chest pain, palpitations  GASTROINTESTINAL:  negative for nausea, vomiting, diarrhea or constipation  GENITOURINARY:  negative for dysuria  HEME:  No easy bruising  INTEGUMENT:  negative for rash or pruritus  NEURO:  Negative for headache         Current Medications:     Current Outpatient Medications   Medication Sig Dispense Refill     bictegravir-emtricitabine-tenofovir (BIKTARVY) -25 MG per tablet Take 1 tablet by mouth daily       buPROPion (WELLBUTRIN XL) 150 MG 24 hr tablet Take 150 mg by mouth At Bedtime       buPROPion (WELLBUTRIN XL) 300 MG 24 hr tablet Take 1 tablet (300 mg) by mouth every morning 90 tablet 1     eszopiclone (LUNESTA) 2 MG tablet Take 2 mg by mouth At Bedtime       mirtazapine (REMERON) 15 MG tablet Take 15 mg by mouth At Bedtime       naltrexone (DEPADE/REVIA) 50 MG tablet Take 1 tablet (50 mg) by mouth daily 90 tablet 1     QUEtiapine (SEROQUEL) 25 MG tablet TAKE 1 TABLET(25 MG) BY MOUTH TWICE DAILY AS NEEDED FOR ANXIETY OR RESTLESSNESS 60 tablet 0     traZODone (DESYREL) 150 MG tablet Take 1 tablet (150 mg) by mouth At Bedtime 30 tablet 1            Immunization History:     There is no immunization history on file for this patient.- will obtain records         Allergies:   No Known Allergies         Physical Exam:   Vital signs:  /70   Pulse 82   Wt 97.9 kg (215 lb 12.8 oz)   SpO2 96%     Physical Examination:  GENERAL:  well-developed, well-nourished, seated in no acute distress.  HEENT:  Head is normocephalic, atraumatic   EYES:  Eyes have anicteric sclerae without conjunctival injection   ENT:  Oropharynx is moist without exudates or ulcers. Tongue is midline  LUNGS:  Clear to auscultation bilateral.   CARDIOVASCULAR:  Regular rate and rhythm  with no murmurs, gallops or rubs.  SKIN:  No acute rashes.    NEUROLOGIC:  Grossly nonfocal. Active x4 extremities         Laboratory Data:     CD4 Count  Absolute CD4, [place] T Cells   Date Value Ref Range Status   08/10/2022 1,286 441 - 2,156 cells/uL Final     CD4% [place] T Cells   Date Value Ref Range Status   08/10/2022 49 28 - 63 % Final     CD4:CD8 Ratio   Date Value Ref Range Status   08/10/2022 1.72 1.40 - 2.60 Final       HIV-1 RNA Quantitative:  HIV-1 RNA copies/mL   Date Value Ref Range Status   08/10/2022 <20 (A) Not Detected Copies/mL Final       Metabolic Studies       Sodium   Date Value Ref Range Status   08/10/2022 134 133 - 144 mmol/L Final     Potassium   Date Value Ref Range Status   08/10/2022 3.9 3.4 - 5.3 mmol/L Final     Chloride   Date Value Ref Range Status   08/10/2022 109 94 - 109 mmol/L Final     Carbon Dioxide (CO2)   Date Value Ref Range Status   08/10/2022 26 20 - 32 mmol/L Final     Anion Gap   Date Value Ref Range Status   08/10/2022 <1 (L) 3 - 14 mmol/L Final     Urea Nitrogen   Date Value Ref Range Status   08/10/2022 15 7 - 30 mg/dL Final     Creatinine   Date Value Ref Range Status   08/10/2022 1.07 0.66 - 1.25 mg/dL Final     GFR Estimate   Date Value Ref Range Status   08/10/2022 83 >60 mL/min/1.73m2 Final     Comment:     Effective December 21, 2021 eGFRcr in adults is calculated using the 2021 CKD-EPI creatinine equation which includes age and gender (David et al., NEJ, DOI: 10.1056/[id number])     Glucose   Date Value Ref Range Status   08/10/2022 92 70 - 99 mg/dL Final     Calcium   Date Value Ref Range Status   08/10/2022 9.2 8.5 - 10.1 mg/dL Final       Hepatic Studies    Bilirubin Total   Date Value Ref Range Status   08/10/2022 0.6 0.2 - 1.3 mg/dL Final     Alkaline Phosphatase   Date Value Ref Range Status   08/10/2022 81 40 - 150 U/L Final     Albumin   Date Value Ref Range Status   08/10/2022 4.0 3.4 - 5.0 g/dL Final     AST   Date Value Ref Range Status    08/10/2022 29 0 - 45 U/L Final     ALT   Date Value Ref Range Status   08/10/2022 33 0 - 70 U/L Final       Hematology Studies      WBC Count   Date Value Ref Range Status   08/10/2022 7.1 4.0 - 11.0 10e3/uL Final     Hemoglobin   Date Value Ref Range Status   08/10/2022 15.8 13.3 - 17.7 g/dL Final     Hematocrit   Date Value Ref Range Status   08/10/2022 45.1 40.0 - 53.0 % Final     Platelet Count   Date Value Ref Range Status   08/10/2022 174 150 - 450 10e3/uL Final

## 2022-08-17 NOTE — LETTER
Date:August 19, 2022      Patient was self referred, no letter generated. Do not send.        [facility] Health Information

## 2022-08-18 ENCOUNTER — TELEPHONE (OUTPATIENT)
Dept: INFECTIOUS DISEASES | Facility: CLINIC | Age: 52
End: 2022-08-18

## 2022-09-11 DIAGNOSIS — F15.20 METHAMPHETAMINE USE DISORDER, SEVERE, IN CONTROLLED ENVIRONMENT (H): ICD-10-CM

## 2022-09-11 DIAGNOSIS — F41.9 ANXIETY: ICD-10-CM

## 2022-09-12 NOTE — TELEPHONE ENCOUNTER
Date of Last Office Visit: 8/12/22  Date of Next Office Visit: None with Bola - 12/22 with Aileen  No shows since last visit: 0  Cancellations since last visit: 9/7/22    Medication requested: QUEtiapine (SEROQUEL) 25 MG tablet Date last ordered: 8/15/22 Qty: 60 Refills: 0     Review of [place] ?: NA    Lapse in medication adherence greater than 5 days?: NO PRN  Medication refill request verified as identical to current order?: yes  Result of Last DAM, VPA, Li+ Level, CBC, or Carbamazepine Level (at or since last visit): N/A    Last visit treatment plan:   ASSESSMENT/PLAN  Diagnoses and all orders for this visit:  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  Denies side effects.  Reports some cravings relieved with as needed Seroquel.  He remains in residential treatment program until 9/1 then will move into sober living.  Plan to continue naltrexone and Wellbutrin.  No refills required at this time  Insomnia, unspecified type  Sleeping well with trazodone 150 mg.  States he was taking 800 mg with 2 mg Lunesta for night terrors and sleep disturbance however he is at unable to take Lunesta while at the retreat.  Refill provided.   -     traZODone (DESYREL) 150 MG tablet; Take 1 tablet (150 mg) by mouth At Bedtime    RTC  Return in about 26 days (around 9/7/2022) for Follow up, with me, in person schedule at 1130.      []Medication refilled per  Medication Refill in Ambulatory Care  policy.  [x]Medication unable to be refilled by RN due to criteria not met as indicated below:    []Eligibility - not seen in the last year   []Supervision - no future appointment   []Compliance - no shows, cancellations or lapse in therapy   []Verification - order discrepancy   []Controlled medication   [x]Medication not included in policy   []90-day supply request   []Other

## 2022-09-13 RX ORDER — QUETIAPINE FUMARATE 25 MG/1
TABLET, FILM COATED ORAL
Qty: 60 TABLET | Refills: 0 | Status: SHIPPED | OUTPATIENT
Start: 2022-09-13

## 2022-10-03 ENCOUNTER — HEALTH MAINTENANCE LETTER (OUTPATIENT)
Age: 52
End: 2022-10-03

## 2023-08-13 ENCOUNTER — HEALTH MAINTENANCE LETTER (OUTPATIENT)
Age: 53
End: 2023-08-13

## 2024-10-06 ENCOUNTER — HEALTH MAINTENANCE LETTER (OUTPATIENT)
Age: 54
End: 2024-10-06

## 2025-08-27 ENCOUNTER — PATIENT OUTREACH (OUTPATIENT)
Dept: INFECTIOUS DISEASES | Facility: CLINIC | Age: 55
End: 2025-08-27
Payer: COMMERCIAL